# Patient Record
Sex: FEMALE | Race: OTHER
[De-identification: names, ages, dates, MRNs, and addresses within clinical notes are randomized per-mention and may not be internally consistent; named-entity substitution may affect disease eponyms.]

---

## 2018-09-14 ENCOUNTER — HOSPITAL ENCOUNTER (EMERGENCY)
Dept: HOSPITAL 74 - JERFT | Age: 43
Discharge: HOME | End: 2018-09-14
Payer: COMMERCIAL

## 2018-09-14 VITALS — SYSTOLIC BLOOD PRESSURE: 152 MMHG | TEMPERATURE: 99.1 F | HEART RATE: 79 BPM | DIASTOLIC BLOOD PRESSURE: 72 MMHG

## 2018-09-14 VITALS — BODY MASS INDEX: 26.5 KG/M2
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
## 2018-09-14 DIAGNOSIS — S63.631A: Primary | ICD-10-CM
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
## 2018-09-14 PROCEDURE — 2W3KX1Z IMMOBILIZATION OF LEFT FINGER USING SPLINT: ICD-10-PCS

## 2018-09-14 NOTE — PDOC
History of Present Illness





- General


Chief Complaint: Pain


Stated Complaint: FINGER INJURY


Time Seen by Provider: 09/14/18 19:49


History Source: Patient


Exam Limitations: No Limitations





- History of Present Illness


Initial Comments: 





09/14/18 20:32


43 yr female with injury to the left index finger yesterday pulling an 

electrical cord out of the socket and strained the finger causing bruising to 

the finger. Pt has from of the finger


09/14/18 20:44








Past History





- Past Medical History


Allergies/Adverse Reactions: 


 Allergies











Allergy/AdvReac Type Severity Reaction Status Date / Time


 


No Known Allergies Allergy   Verified 09/14/18 19:53











Home Medications: 


Ambulatory Orders





NK [No Known Home Medication]  09/14/18 








COPD: No


Other medical history: Pt denies





- Surgical History


Gastric Stapling:  (Gastric bypass)





- Suicide/Smoking/Psychosocial Hx


Smoking History: Never smoked


Have you smoked in the past 12 months: No


Information on smoking cessation initiated: No


Hx Alcohol Use: No


Drug/Substance Use Hx: No


Substance Use Type: None





*Physical Exam





- Vital Signs


 Last Vital Signs











Temp Pulse Resp BP Pulse Ox


 


 99.1 F   79   18   152/72   100 


 


 09/14/18 19:53  09/14/18 19:53  09/14/18 19:53  09/14/18 19:53  09/14/18 19:53














- Physical Exam


General Appearance: Yes: Nourished, Appropriately Dressed


HEENT: positive: EOMI, SPARKLE


Extremity: positive: Normal Capillary Refill, Other (bruising noted to the left 

index finger on the volar surface at the dip, nv intact FROM )


Integumentary: positive: Normal Color, Dry, Warm


Neurologic: positive: Fully Oriented, Alert, Normal Mood/Affect, Normal Response

, Motor Strength 5/5





Procedures





- Splinting


Splint Location: Left: Finger (index finger)





Medical Decision Making





- Medical Decision Making





09/14/18 20:50


cc: finger injury bruising noted


xray is negative


finger splint placed


follow with the orthopedist next week 











*DC/Admit/Observation/Transfer


Diagnosis at time of Disposition: 


Finger injury


Qualifiers:


 Encounter type: initial encounter Laterality: left Qualified Code(s): S69.92XA 

- Unspecified injury of left wrist, hand and finger(s), initial encounter





Sprain of finger


Qualifiers:


 Encounter type: initial encounter Finger: index finger Sprain of finger site: 

interphalangeal joint Laterality: left Qualified Code(s): S63.631A - Sprain of 

interphalangeal joint of left index finger, initial encounter








- Discharge Dispostion


Disposition: HOME


Condition at time of disposition: Good





- Referrals


Referrals: 


Naseem Banuelos MD [Staff Physician] - 





- Patient Instructions


Additional Instructions: 


follow with the hand specialist  if symptoms worsen or do not improve


use the splint while awake remove to sleep and bathe 


ice applied every 2hrs for 20 minutes 











- Post Discharge Activity

## 2018-09-14 NOTE — PDOC
Rapid Medical Evaluation


Medical Evaluation: 





09/14/18 19:45


I have performed a brief in-person evaluation of this patient.





The patient presents with a chief complaint of:L 2nd finger injury yesterday 

while puling cord yesterday. No fall, no crush injury





Pertinent physical exam findings: ecchymosis to L 2nd finger 





I have ordered the following:xray





The patient will proceed to the ED for further evaluation.





 





**Discharge Disposition





- Diagnosis


Finger injury


Qualifiers:


 Encounter type: initial encounter Laterality: left Qualified Code(s): S69.92XA 

- Unspecified injury of left wrist, hand and finger(s), initial encounter








- Referrals





- Patient Instructions





- Post Discharge Activity

## 2018-10-04 ENCOUNTER — HOSPITAL ENCOUNTER (OUTPATIENT)
Dept: HOSPITAL 74 - JER | Age: 43
Setting detail: OBSERVATION
LOS: 1 days | Discharge: HOME | End: 2018-10-05
Attending: INTERNAL MEDICINE | Admitting: INTERNAL MEDICINE
Payer: COMMERCIAL

## 2018-10-04 VITALS — BODY MASS INDEX: 26.9 KG/M2

## 2018-10-04 DIAGNOSIS — Z98.84: ICD-10-CM

## 2018-10-04 DIAGNOSIS — D50.9: Primary | ICD-10-CM

## 2018-10-04 LAB
ALBUMIN SERPL-MCNC: 3.6 G/DL (ref 3.4–5)
ALP SERPL-CCNC: 70 U/L (ref 45–117)
ALT SERPL-CCNC: 16 U/L (ref 13–61)
ANION GAP SERPL CALC-SCNC: 6 MMOL/L (ref 8–16)
ANISOCYTOSIS BLD QL: (no result)
AST SERPL-CCNC: 13 U/L (ref 15–37)
BASOPHILS # BLD: 1.2 % (ref 0–2)
BILIRUB SERPL-MCNC: 0.6 MG/DL (ref 0.2–1)
BUN SERPL-MCNC: 16 MG/DL (ref 7–18)
CALCIUM SERPL-MCNC: 8.5 MG/DL (ref 8.5–10.1)
CHLORIDE SERPL-SCNC: 110 MMOL/L (ref 98–107)
CO2 SERPL-SCNC: 25 MMOL/L (ref 21–32)
CREAT SERPL-MCNC: 0.8 MG/DL (ref 0.55–1.3)
DEPRECATED RDW RBC AUTO: 18.2 % (ref 11.6–15.6)
EOSINOPHIL # BLD: 3.5 % (ref 0–4.5)
GLUCOSE SERPL-MCNC: 132 MG/DL (ref 74–106)
HCT VFR BLD CALC: 21.3 % (ref 32.4–45.2)
HGB BLD-MCNC: 6 GM/DL (ref 10.7–15.3)
LYMPHOCYTES # BLD: 36.3 % (ref 8–40)
MACROCYTES BLD QL: (no result)
MCH RBC QN AUTO: 16.8 PG (ref 25.7–33.7)
MCHC RBC AUTO-ENTMCNC: 28.3 G/DL (ref 32–36)
MCV RBC: 59.2 FL (ref 80–96)
MONOCYTES # BLD AUTO: 7.5 % (ref 3.8–10.2)
NEUTROPHILS # BLD: 51.5 % (ref 42.8–82.8)
OVALOCYTES BLD QL SMEAR: (no result)
PLATELET # BLD AUTO: 340 K/MM3 (ref 134–434)
PLATELET BLD QL SMEAR: ADEQUATE
PMV BLD: 8.7 FL (ref 7.5–11.1)
POTASSIUM SERPLBLD-SCNC: 3.8 MMOL/L (ref 3.5–5.1)
PROT SERPL-MCNC: 6.9 G/DL (ref 6.4–8.2)
RBC # BLD AUTO: 3.59 M/MM3 (ref 3.6–5.2)
SODIUM SERPL-SCNC: 140 MMOL/L (ref 136–145)
WBC # BLD AUTO: 3.6 K/MM3 (ref 4–10)

## 2018-10-04 PROCEDURE — 30233N1 TRANSFUSION OF NONAUTOLOGOUS RED BLOOD CELLS INTO PERIPHERAL VEIN, PERCUTANEOUS APPROACH: ICD-10-PCS | Performed by: INTERNAL MEDICINE

## 2018-10-04 PROCEDURE — 3E033GC INTRODUCTION OF OTHER THERAPEUTIC SUBSTANCE INTO PERIPHERAL VEIN, PERCUTANEOUS APPROACH: ICD-10-PCS | Performed by: INTERNAL MEDICINE

## 2018-10-04 PROCEDURE — P9058 RBC, L/R, CMV-NEG, IRRAD: HCPCS

## 2018-10-04 PROCEDURE — P9038 RBC IRRADIATED: HCPCS

## 2018-10-04 PROCEDURE — G0378 HOSPITAL OBSERVATION PER HR: HCPCS

## 2018-10-04 RX ADMIN — FERROUS SULFATE TAB EC 324 MG (65 MG FE EQUIVALENT) SCH MG: 324 (65 FE) TABLET DELAYED RESPONSE at 20:30

## 2018-10-04 NOTE — PN
Teaching Attending Note


Name of Resident: Jericho Beck





ATTENDING PHYSICIAN STATEMENT





I saw and evaluated the patient.


I reviewed the resident's note and discussed the case with the resident.


I agree with the resident's findings and plan as documented.








SUBJECTIVE:


Patient is a 43 year old woman with history of gastric bypass sent to the ER to 

receive a blood transfusion after her blood work showed a Hb of 6.2 in he PCP's 

office. She reports feelings of fatigue for the past 2 months. She also 

endorses menometrorhaggia as she used around 6 pads a day during her period. 

She denies intermenstrual spotting or vaginal bleeding. She denies hematuria or 

hematochezia. Denies SOB, palpitations, difficulty breathing, or back pain. 

Denies hot or cold intolerance. 





OBJECTIVE:


Alert


 Vital Signs











 Period  Temp  Pulse  Resp  BP Sys/Kerns  Pulse Ox


 


 Last 24 Hr  98.2 F  91  16  148/66  100











HEENT: No Jaundice, eye redness or discharge, +Pallor; PERRLA, EOMI. 

Normocephalic, atraumatic. External ears are normal and hearing is grossly 

intact. No nasal discharge.


Neck: Supple, nontender. No palpable adenopathy or thyromegaly. No JVD


Chest: Good effort. Clear to auscultation and percussion.


Heart: Regular. No S3 or rub; 2/6 COLUMBA


Abdomen: Not distended, soft, nontender and no HSM. No rebound or guarding.  

Normoactive bowel sounds.


Ext: Peripheral pulses intact. No leg edema.


Skin: Warm and dry. No petechiae, rash or ecchymosis.


Neuro: Alert. Oriented x3. CN 2-12 grossly intact. Sensation grossly intact in 

all four extremities and DTR are symmetric.





 Current Medications











Generic Name Dose Route Start Last Admin





  Trade Name Freq  PRN Reason Stop Dose Admin


 


Docusate Sodium  100 mg  10/04/18 18:35  





  Colace -  PO   





  BID PRN   





  CONSTIPATION   





     





     





     


 


Ferrous Sulfate  325 mg  10/04/18 18:45  





  Feosol -  PO   





  DAILY BERT   





     





     





     





     


 


Polyethylene Glycol  17 gm  10/05/18 10:00  





  Miralax (For Daily Use) -  PO   





  DAILY BERT   





     





     





     





     








 Home Medications











 Medication  Instructions  Recorded


 


NK [No Known Home Medication]  09/14/18








 Abnormal Lab Results











  10/04/18 10/04/18 10/04/18





  16:10 16:10 17:34


 


WBC  3.6 L  


 


RBC  3.59 L  


 


Hgb  6.0 L*  


 


Hct  21.3 L  


 


MCV  59.2 L  


 


MCH  16.8 L  


 


MCHC  28.3 L  


 


RDW  18.2 H  


 


Chloride    110 H


 


Anion Gap    6 L


 


Random Glucose    132 H


 


AST    13 L


 


Crossmatch   See Detail 











ASSESSMENT AND PLAN:


1. Symptomatic Anemia - Low MCV anemia most likely due to menstrual blood loss. 

Will do basic anemia work up including serial stool guaiacs, reticulocyte count 

and iron studies. Would also check Vit B12 and folate levels in view of bypass 

history. Has already been slated for blood transfusion. Once iron deficiency is 

confirmed, in the future, will benefit from IV iron therapy. Needs uterine 

sonogram to rule out fibroids and GI consult for colonoscopy.





2. DVT prophylaxis - Lovenox 40 mg SQ q 24 hours.





3. Advance directives - Full code

## 2018-10-04 NOTE — PDOC
Rapid Medical Evaluation


Chief Complaint: Blood Transfusion


Time Seen by Provider: 10/04/18 15:54


Medical Evaluation: 


 Allergies











Allergy/AdvReac Type Severity Reaction Status Date / Time


 


No Known Allergies Allergy   Verified 09/14/18 19:53











10/04/18 15:55


CC: "I need a blood transfusion."





HPI:  Pt is an 43 F sent by her PCP for a blood transfusion.  Pt denies 

menorrhagia, denies hematochezia, admits to fatigue and SOB.  Hg was 6.1 

according to patient.





I have performed a brief in- person evaluation of this patient.





Pertinent Physical Findings:


Skin: Pale


Lungs: Clear


Heart: RRR


Abdomen: no pain upon palpation; no CVAT


Neuro: Alert


Psych: Appropriate affect





I have ordered: basic labs and type and cross





The patient will proceed to: Main ED for further evaluation





**Discharge Disposition





- Diagnosis


Anemia


Qualifiers:


 Anemia type: other cause 








- Referrals





- Patient Instructions





- Post Discharge Activity

## 2018-10-04 NOTE — PDOC
Attending Attestation





- Resident


Resident Name: Gurwinder Adams





- ED Attending Attestation


I have performed the following: I have examined & evaluated the patient, The 

case was reviewed & discussed with the resident, I agree w/resident's findings 

& plan, Exceptions are as noted





- HPI


HPI: 





10/04/18 17:42


The patient is a 43 year old female, with a significant past medical history of 

gastric bypass, who presents to the emergency department for evaluation of a 

hemoglobin of 6.2 at Dr. Smith office. Pt states that she has typically very 

heavy periods, soaking through 6 pads per day. Denies any vaginal bleeding at 

the time. Denies dark stool or BRBPR.





The patient denies chest pain, shortness of breath, headache and dizziness. The 

patient denies fever, chills, nausea, vomit, diarrhea and constipation. The 

patient denies dysuria, frequency, urgency and hematuria. 





Allergies: NKDA


Past surgical history: Gastric bypass 


Social history: denies toxic habits


PCP - Dr. Jericho Amin








- Physicial Exam


PE: 





10/04/18 17:56


GENERAL: Awake, alert, and fully oriented, in no acute distress.


HEAD: No signs of trauma


EYES: PERRLA, EOMI, sclera anicteric, conjunctiva clear


ENT: Auricles normal inspection, hearing grossly normal, nares patent, 

oropharynx clear without exudates. Moist mucosa


NECK: Nontender, no stepoffs, Normal ROM, supple, no lymphadenopathy, JVD, or 

masses


LUNGS: Breath sounds equal, clear to auscultation bilaterally.  No wheezes, and 

no crackles


HEART: Regular rate and rhythm, normal S1 and S2, no murmurs, rubs or gallops


ABDOMEN: Soft, nontender, normoactive bowel sounds.  No guarding, no rebound.  

No masses


EXTREMITIES: Normal range of motion, no edema.  No clubbing or cyanosis. No 

cords, erythema, or tenderness


NEUROLOGICAL: Cranial nerves II through XII intact. 5/5 strength and sensation 

in all extremities, Normal speech, normal gait, normal cerebellar function


SKIN: Warm, Dry, normal turgor, no rashes or lesions noted.





- Medical Decision Making





10/04/18 17:56


43 F with low Hb on outpt labs, likely 2/2 menorrhagia. No evidence of GI 

bleed. Pt HD stable in ED.


- Labs, T&S, coags


- Transfuse PRN

## 2018-10-04 NOTE — PDOC
History of Present Illness





- General


Chief Complaint: Blood Transfusion


Stated Complaint: PCP SENT


Time Seen by Provider: 10/04/18 15:54





- History of Present Illness


Initial Comments: 





44 yo F w no sig pmh and no known hx of anemia was sent here to the ER to 

receive a blood transfusion after her bloodwork showed a Hb of 6.2. She reports 

feelings of fatigue for the past 2 months. She also endorses menometrorhaggia 

as she used around 6 pads a day during her period. She denies intermenstrual 

spotting or vaginal bleeding. She denies hematuria or hematochezia. 


Denies recent travel, infections, fevers, or chills. Denies SOB, difficulty 

breathing, or back pain. Denies hot or cold intolerance. Denies palpitations. 





Social hx: Denies cigarette, alcohol, or illicit drug usage. 


PCP: Jericho torres











Past History





- Past Medical History


Allergies/Adverse Reactions: 


 Allergies











Allergy/AdvReac Type Severity Reaction Status Date / Time


 


No Known Allergies Allergy   Verified 10/04/18 15:55











Home Medications: 


Ambulatory Orders





NK [No Known Home Medication]  09/14/18 








COPD: No


CHF: No





- Surgical History


Gastric Stapling:  (Gastric bypass)





- Immunization History


Immunization Up to Date: Yes





- Suicide/Smoking/Psychosocial Hx


Smoking History: Never smoked


Have you smoked in the past 12 months: No


Hx Alcohol Use: No


Drug/Substance Use Hx: No


Substance Use Type: None





**Review of Systems





- Review of Systems


Comments:: 





CONSTITUTIONAL: 


Present: generalized weakness


Absent: fever, chills, diaphoresis, malaise, loss of appetite


HEENT: 


Absent: rhinorrhea, nasal congestion, throat pain, throat swelling, difficulty 

swallowing, mouth swelling, ear pain, eye pain, visual Changes


CARDIOVASCULAR: 


Present: lightheadedness


Absent: chest pain, syncope, palpitations, irregular heart rate, peripheral 

edema


RESPIRATORY: 


Absent: cough, shortness of breath, dyspnea with exertion, orthopnea, wheezing, 

stridor, hemoptysis


GASTROINTESTINAL:


Absent: abdominal pain, abdominal distension, nausea, vomiting, diarrhea, 

constipation, melena, hematochezia


GENITOURINARY: 


Absent: dysuria, frequency, urgency, hesitancy, hematuria, flank pain, genital 

pain


MUSCULOSKELETAL: 


Absent: myalgia, arthralgia, joint swelling


SKIN: 


Present: Pallor


Absent: rash, itching


HEMATOLOGIC/IMMUNOLOGIC: 


Absent: easy bleeding, easy bruising, lymphadenopathy, frequent infections


ENDOCRINE:


Absent: unexplained weight gain, unexplained weight loss, heat intolerance, 

cold intolerance


NEUROLOGIC: 


Absent: headache, focal weakness or paresthesias, dizziness, unsteady gait, 

seizure, mental status changes, bladder or bowel incontinence


PSYCHIATRIC: 


Absent: anxiety, depression, suicidal or homicidal ideation, hallucinations.











*Physical Exam





- Vital Signs


 Last Vital Signs











Temp Pulse Resp BP Pulse Ox


 


 98.2 F   91 H  16   148/66   100 


 


 10/04/18 15:56  10/04/18 15:56  10/04/18 15:56  10/04/18 15:56  10/04/18 15:56














- Physical Exam


Comments: 





GENERAL:


Well developed, well nourished. Awake and alert. No acute distress.


HEENT:


There is conjunctival pallor. 


Normocephalic, atraumatic. PERRLA, EOMI. Sclera are non-icteric. Moist mucous 

membranes. Oropharynx is clear.


NECK: 


Supple. Full ROM. No JVD. No thyromegaly. No lymphadenopathy.


CARDIOVASCULAR:


Regular rate and rhythm. No murmurs, rubs, or gallops. Distal pulses are 2+ and 

symmetric. 


PULMONARY: 


No evidence of respiratory distress. Lungs clear to auscultation bilaterally. 

No wheezing, rales or rhonchi.


ABDOMINAL:


Soft. Non-tender. Non-distended. No rebound or guarding. No organomegaly. 

Normoactive bowel sounds. 


MUSCULOSKELETAL 


Normal range of motion at all joints. No bony deformities or tenderness. No CVA 

tenderness.


EXTREMITIES: 


No cyanosis. No clubbing. No edema. No calf tenderness.


SKIN: 


Warm and dry. Normal capillary refill. No rashes. No jaundice. 


NEUROLOGICAL: 


Alert, awake, appropriate. Cranial nerves 2-12 intact. Normal speech.


PSYCHIATRIC: 


Cooperative. Good eye contact. Appropriate mood and affect.











ED Treatment Course





- LABORATORY


CBC & Chemistry Diagram: 


 10/04/18 16:10








- ADDITIONAL ORDERS


Additional order review: 


 Laboratory  Results











  10/04/18





  16:10


 


WBC  3.6 L


 


RBC  3.59 L


 


Hct  21.3 L


 


MCV  59.2 L


 


MCH  16.8 L


 


MCHC  28.3 L


 


RDW  18.2 H


 


Plt Count  340


 


MPV  8.7


 


Absolute Neuts (auto)  1.8


 


Neutrophils %  51.5


 


Lymphocytes %  36.3


 


Monocytes %  7.5


 


Eosinophils %  3.5


 


Basophils %  1.2


 


Nucleated RBC %  0








 











  10/04/18





  16:10


 


RBC  3.59 L


 


MCV  59.2 L


 


MCHC  28.3 L


 


RDW  18.2 H


 


MPV  8.7


 


Neutrophils %  51.5


 


Lymphocytes %  36.3


 


Monocytes %  7.5


 


Eosinophils %  3.5


 


Basophils %  1.2














Medical Decision Making





- Medical Decision Making





44 yo F w no sig pmh and no known hx of anemia was sent here to the ER to 

receive a blood transfusion after her bloodwork showed a Hb of 6.2.


It's possible her anemia is a result of blood loss from heavy periods. 





Plan: Basic labs, ekg, urine, blood transfusion, re-assess, admit to [facility]. 








*DC/Admit/Observation/Transfer


Diagnosis at time of Disposition: 


Anemia


Qualifiers:


 Anemia type: other cause 








- Referrals





- Patient Instructions





- Post Discharge Activity

## 2018-10-04 NOTE — HP
CHIEF COMPLAINT: anemia





PCP: Dr. Jericho Amin





HISTORY OF PRESENT ILLNESS:





43 year old female with no pmh presents to the hospital after being sent from 

PCP for a hgb of 6.2. Patient reports that for the past 2 months, she's been 

feeling fatigued, dizzy, and short of breath on exertion. She denies any overt 

chest pain or shortness of breath at rest. Denies feeling this way before, but 

reports that she was told she had anemia in the past. Her last menstrual period 

was 2 weeks ago and lasted 5 days with normal flow. She Does state that in the 

past, 1-3 years ago, she had episodes of heavier periods, but those have since 

calmed down. She reports that she currently chews on ice. Denies fevers, chills

, chest pain, SOB, nausea, vomiting, diarrhea. Denies any family history of 

colon cancer.





ER course was notable for:


(1) Hgb 6


(2) WBC 3.6


(3)





Recent Travel: denies





PAST MEDICAL HISTORY: anemia





PAST SURGICAL HISTORY: gastric bypass 





Social History:


Smoking: never


Alcohol: never


Drugs: never





Family History: hypothyroidism in mother, prostate and thyroid problems in 

father, hypertension on both sides of the family.


Allergies





No Known Allergies Allergy (Verified 10/04/18 15:55)


 








HOME MEDICATIONS:


 Home Medications











 Medication  Instructions  Recorded


 


NK [No Known Home Medication]  09/14/18








REVIEW OF SYSTEMS


CONSTITUTIONAL: generalized weakness


Absent:  fever, chills, diaphoresis, , malaise, loss of appetite, weight change


HEENT: 


Absent:  rhinorrhea, nasal congestion, throat pain, throat swelling, difficulty 

swallowing, mouth swelling, ear pain, eye pain, visual changes


CARDIOVASCULAR: 


Absent: chest pain, syncope, palpitations, irregular heart rate, lightheadedness

, peripheral edema


RESPIRATORY: 


Absent: cough, shortness of breath, dyspnea with exertion, orthopnea, wheezing, 

stridor, hemoptysis


GASTROINTESTINAL:


Absent: abdominal pain, abdominal distension, nausea, vomiting, diarrhea, 

constipation, melena, hematochezia


GENITOURINARY: 


Absent: dysuria, frequency, urgency, hesitancy, hematuria, flank pain, genital 

pain


MUSCULOSKELETAL: 


Absent: myalgia, arthralgia, joint swelling, back pain, neck pain


SKIN: 


Absent: rash, itching, pallor


HEMATOLOGIC/IMMUNOLOGIC: 


Absent: easy bleeding, easy bruising, lymphadenopathy, frequent infections


ENDOCRINE:


Absent: unexplained weight gain, unexplained weight loss, heat intolerance, 

cold intolerance


NEUROLOGIC: 


Absent: headache, focal weakness or paresthesias, dizziness, unsteady gait, 

seizure, mental status changes, bladder or bowel incontinence


PSYCHIATRIC: 


Absent: anxiety, depression, suicidal or homicidal ideation, hallucinations.








PHYSICAL EXAMINATION


 Vital Signs - 24 hr











  10/04/18





  15:56


 


Temperature 98.2 F


 


Pulse Rate 91 H


 


Respiratory 16





Rate 


 


Blood Pressure 148/66


 


O2 Sat by Pulse 100





Oximetry (%) 











GENERAL: A&Ox3, no acute distress


EYES: PERRLA, EOMI


ENT: Moist mucus membranes


NECK: No JVD


LUNGS: CTA, no wheezes


HEART: RRR, harsh systolic murmur appreciated on exam


ABDOMEN: Soft, nontender, BS present


MUSCULOSKELETAL: No CVA Tenderness


EXTREMITIES: 2+ pulses, no edema. 


NEUROLOGICAL:  Cranial nerves II-XII intact.








 Laboratory Results - last 24 hr











  10/04/18 10/04/18 10/04/18





  16:10 16:10 17:34


 


WBC  3.6 L  


 


RBC  3.59 L  


 


Hgb  6.0 L*  


 


Hct  21.3 L  


 


MCV  59.2 L  


 


MCH  16.8 L  


 


MCHC  28.3 L  


 


RDW  18.2 H  


 


Plt Count  340  


 


MPV  8.7  


 


Absolute Neuts (auto)  1.8  


 


Neutrophils %  51.5  


 


Lymphocytes %  36.3  


 


Monocytes %  7.5  


 


Eosinophils %  3.5  


 


Basophils %  1.2  


 


Nucleated RBC %  0  


 


Sodium    140


 


Potassium    3.8


 


Chloride    110 H


 


Carbon Dioxide    25


 


Anion Gap    6 L


 


BUN    16


 


Creatinine    0.8


 


Creat Clearance w eGFR    > 60


 


Random Glucose    132 H


 


Calcium    8.5


 


Total Bilirubin    0.6


 


AST    13 L


 


ALT    16


 


Alkaline Phosphatase    70


 


Total Protein    6.9


 


Albumin    3.6


 


Serum Pregnancy, Qual   


 


Blood Type   O POSITIVE 


 


Antibody Screen   Negative 


 


Crossmatch   See Detail 














  10/04/18





  17:34


 


WBC 


 


RBC 


 


Hgb 


 


Hct 


 


MCV 


 


MCH 


 


MCHC 


 


RDW 


 


Plt Count 


 


MPV 


 


Absolute Neuts (auto) 


 


Neutrophils % 


 


Lymphocytes % 


 


Monocytes % 


 


Eosinophils % 


 


Basophils % 


 


Nucleated RBC % 


 


Sodium 


 


Potassium 


 


Chloride 


 


Carbon Dioxide 


 


Anion Gap 


 


BUN 


 


Creatinine 


 


Creat Clearance w eGFR 


 


Random Glucose 


 


Calcium 


 


Total Bilirubin 


 


AST 


 


ALT 


 


Alkaline Phosphatase 


 


Total Protein 


 


Albumin 


 


Serum Pregnancy, Qual  Negative


 


Blood Type 


 


Antibody Screen 


 


Crossmatch 











ASSESSMENT/PLAN:





43 year old female with no pmh presents for anemia





#Anemia: likely iron deficiency compounded on former heavy menstruation


-transfuse 2U PRBCs


-repeat H&H in between units and in AM


-ferrous sulfate in the morning 325 daily


-stool softeners


-iron studies ordered for the morning


-reticulocytes ordered


-TV US for fibroids ordered


-GI consulted for anemia, poss colo as outpt





#FEN


-2 U blood ordered


-replete lytes as necessary in AM


-regular diet





#Prophylaxis


-SCDs





#Disposition


-admit obs





Visit type





- Emergency Visit


Emergency Visit: Yes


Care time: The patient presented to the Emergency Department on the above date 

and was hospitalized for further evaluation of their emergent condition.





- New Patient


This patient is new to me today: Yes


Date on this admission: 10/04/18





- Critical Care


Critical Care patient: No

## 2018-10-05 VITALS — HEART RATE: 62 BPM | DIASTOLIC BLOOD PRESSURE: 72 MMHG | SYSTOLIC BLOOD PRESSURE: 138 MMHG | TEMPERATURE: 98.2 F

## 2018-10-05 LAB
ANION GAP SERPL CALC-SCNC: 7 MMOL/L (ref 8–16)
BUN SERPL-MCNC: 14 MG/DL (ref 7–18)
CALCIUM SERPL-MCNC: 9 MG/DL (ref 8.5–10.1)
CHLORIDE SERPL-SCNC: 111 MMOL/L (ref 98–107)
CO2 SERPL-SCNC: 26 MMOL/L (ref 21–32)
CREAT SERPL-MCNC: 0.6 MG/DL (ref 0.55–1.3)
DEPRECATED RDW RBC AUTO: 25.7 % (ref 11.6–15.6)
GLUCOSE SERPL-MCNC: 72 MG/DL (ref 74–106)
HCT VFR BLD CALC: 29.3 % (ref 32.4–45.2)
HGB BLD-MCNC: 8.8 GM/DL (ref 10.7–15.3)
MCH RBC QN AUTO: 19.9 PG (ref 25.7–33.7)
MCHC RBC AUTO-ENTMCNC: 30 G/DL (ref 32–36)
MCV RBC: 66.3 FL (ref 80–96)
PLATELET # BLD AUTO: 296 K/MM3 (ref 134–434)
PMV BLD: 8.4 FL (ref 7.5–11.1)
POTASSIUM SERPLBLD-SCNC: 4.3 MMOL/L (ref 3.5–5.1)
RBC # BLD AUTO: 4.43 M/MM3 (ref 3.6–5.2)
SODIUM SERPL-SCNC: 143 MMOL/L (ref 136–145)
WBC # BLD AUTO: 4.5 K/MM3 (ref 4–10)

## 2018-10-05 RX ADMIN — FERROUS SULFATE TAB EC 324 MG (65 MG FE EQUIVALENT) SCH MG: 324 (65 FE) TABLET DELAYED RESPONSE at 09:30

## 2018-10-05 NOTE — DS
Physical Exam: 


SUBJECTIVE: Patient seen and examined this morning at bedside while receiving 

blood transfusion. Has never received transfusion before. Denies any noticeable 

bleeds including hematuria, hematemesis, hematochezia or hemoptysis. Has a 3 

year hx of clots when menstruating that have recently subsided, approx 3 months 

ago. Recently has used 1000mg PO Tylenol for toothache. Denies any excess NSAID 

use, hx of PUD or recent dark stools. Currently denies any SOB, headache, 

fatigues, dizziness, lightheadedness, chest pain, SOB, nausea, vomiting. Says 

she feels much better since admission.








OBJECTIVE:





 Vital Signs











 Period  Temp  Pulse  Resp  BP Sys/Kerns  Pulse Ox


 


 Last 24 Hr  98.1 F-98.9 F  60-75  16-18  118-144/65-90  100-100








PHYSICAL EXAM








GENERAL: A&Ox3, NAD


EYES: PERRL, EOMI


ENT: oropharynx clear without exudates, MMM


NECK: No JVD


LUNGS: CTA b/l, no wheezes


HEART: Regular rate and rhythm, S1, S2 without murmur


ABDOMEN: Soft, nontender, nondistended, + bowel sounds, no guarding


EXTREMITIES: 2+ pulses, no edema. 


NEUROLOGICAL: Cranial nerves II through XII grossly intact. Normal speech. 5/5 

Muscle strength to Handgrip, Elbow flexion/extension, and hip flexion/

extension. Gross sensation intact throughout. 


PSYCH: Normal mood, normal affect.


SKIN: Warm, dry, normal turgor, no rashes or lesions noted.











LABS


 Laboratory Results - last 24 hr











  10/04/18 10/04/18 10/04/18





  16:10 16:10 17:34


 


WBC   


 


RBC   


 


Hgb   


 


Hct   


 


MCV   


 


MCH   


 


MCHC   


 


RDW   


 


Plt Count   


 


MPV   


 


Hypochromia  3+  


 


Platelet Estimate  Adequate  


 


Platelet Comment  No clumping noted  


 


Polychromasia  1+  


 


Anisocytosis  3+  


 


Microcytosis  3+  


 


Macrocytosis  1+  


 


Ovalocytes  1+  


 


Schistocytes  1+  


 


Retic Count   


 


Sodium   


 


Potassium   


 


Chloride   


 


Carbon Dioxide   


 


Anion Gap   


 


BUN   


 


Creatinine   


 


Creat Clearance w eGFR   


 


Random Glucose   


 


Calcium   


 


Ferritin   


 


Serum Pregnancy, Qual    Negative


 


Blood Type   O POSITIVE 


 


Antibody Screen   Negative 


 


Crossmatch   See Detail 














  10/04/18 10/05/18 10/05/18





  20:50 09:57 09:57


 


WBC   4.5 


 


RBC   4.43 


 


Hgb   8.8 L 


 


Hct   29.3 L D 


 


MCV   66.3 L 


 


MCH   19.9 L D 


 


MCHC   30.0 L 


 


RDW   25.7 H 


 


Plt Count   296 


 


MPV   8.4 


 


Hypochromia   


 


Platelet Estimate   


 


Platelet Comment   


 


Polychromasia   


 


Anisocytosis   


 


Microcytosis   


 


Macrocytosis   


 


Ovalocytes   


 


Schistocytes   


 


Retic Count   


 


Sodium    143


 


Potassium    4.3


 


Chloride    111 H


 


Carbon Dioxide    26


 


Anion Gap    7 L


 


BUN    14


 


Creatinine    0.6


 


Creat Clearance w eGFR    > 60


 


Random Glucose    72 L


 


Calcium    9.0


 


Ferritin   


 


Serum Pregnancy, Qual   


 


Blood Type  O POSITIVE  


 


Antibody Screen   


 


Crossmatch   














  10/05/18 10/05/18





  09:57 09:57


 


WBC  


 


RBC  


 


Hgb  


 


Hct  


 


MCV  


 


MCH  


 


MCHC  


 


RDW  


 


Plt Count  


 


MPV  


 


Hypochromia  


 


Platelet Estimate  


 


Platelet Comment  


 


Polychromasia  


 


Anisocytosis  


 


Microcytosis  


 


Macrocytosis  


 


Ovalocytes  


 


Schistocytes  


 


Retic Count   1.09


 


Sodium  


 


Potassium  


 


Chloride  


 


Carbon Dioxide  


 


Anion Gap  


 


BUN  


 


Creatinine  


 


Creat Clearance w eGFR  


 


Random Glucose  


 


Calcium  


 


Ferritin  3.6 L 


 


Serum Pregnancy, Qual  


 


Blood Type  


 


Antibody Screen  


 


Crossmatch  














IMAGING:


-EKG: NORMAL SINUS RHYTHM, NORMAL ECG


-TVUS: No discrete uterine leiomyoma is identified. The uterine echotexture is 

slightly heterogeneous diffusely which could be on the basis of adenomyosis. If 

clinically indicated correlate with nonemergent MRI. Mild nonspecific 

endometrial thickening is seen which may be physiologic in nature. Correlate 

with menstrual cycle phase. 2.7 cm right ovarian cyst. The left ovary could not 

be definitely visualized. 








HOSPITAL COURSE:





Date of Admission:10/04/18





Date of Discharge: 10/05/18





44 y/o F with no significant PMHx presented to [facility] ED from her PCP with a Hgb 

of 6.2. 2 Units of pRBC's were transfused and her repeat Hgb was 8.8. Patient 

was also started on Ferrous sulfate and Iron Sucrose 100mg. A TVUS was done and 

is noted above. Patient was discharged home with strict instructions to follow 

up with hematology to complete the iron infusion protocol, gynecology, begin 

Iron supplementation and repeat her CBC in one week. 














Minutes to complete discharge: 40





Discharge Summary


Reason For Visit: ANEMIA


Current Active Problems





Anemia (Acute)








Condition: Improved





- Instructions


Diet, Activity, Other Instructions: 


You presented to the hospital with a hemoglobin level of 6.0. 2 units of blood 

was transfused and your symptoms resolved. Your repeat hemoglobin is 8.8. 





Your blood work showed your iron levels were low. You received Iron infusions 

during your stay and will need to follow up with hematology to complete the 

course of intravenous Iron . Please call Dr. Courtney's office to schedule follow 

up. 





You are being started on Iron supplements to be taken twice a day for the first 

week and then three times a day there after. take colace ( sttool softner , 

over the counter ) daily or twice a day to avoid constipation that comes 

withiron use 





You need to follow up with your gynecologist to further manage your gynecologic 

symptoms. and to follow up on ultrasound findings 





Please follow up with your primary care physician in one week. 


You need to have blood work (Complete blood count) done to recheck your 

hemoglobin level. 





If you do not have a primary doctor, you can follow up at the [facility] with Dr. Chavis on Tuesday at 2pm. 





Please return to the ER if you have any signs or symptoms of chest pain, 

shortness of breath, uncontrollable fever, chills, nausea, vomiting, numbness, 

tingling, or weakness in any part of your body, changes in vision, or slurred 

speech.





Please return to the ER if symptoms persist, worsen, or new symptoms arise.








Referrals: 


Chris Valverde MD [Staff Physician] - 1 Week


Matt Courtney MD [Staff Physician] - 1 Week


Disposition: HOME





- Home Medications


Comprehensive Discharge Medication List: 


Ambulatory Orders





Docusate Sodium [Colace -] 100 mg PO BID PRN #60 capsule 10/05/18 


Ferrous Sulfate [Feosol] 325 mg PO BID #77 ud 10/05/18 








This patient is new to me today: Yes


Date on this admission: 10/05/18


Emergency Visit: Yes


ED Registration Date: 10/04/18


Care time: The patient presented to the Emergency Department on the above date 

and was hospitalized for further evaluation of their emergent condition.


Critical Care patient: No





- Discharge Referral


Referred to [facility] No

## 2018-10-05 NOTE — EKG
Test Reason : 

Blood Pressure : ***/*** mmHG

Vent. Rate : 079 BPM     Atrial Rate : 079 BPM

   P-R Int : 174 ms          QRS Dur : 074 ms

    QT Int : 382 ms       P-R-T Axes : 046 020 029 degrees

   QTc Int : 438 ms

 

NORMAL SINUS RHYTHM

NORMAL ECG

NO PREVIOUS ECGS AVAILABLE

Confirmed by GIA CURTIS MD ([id number]) on 10/5/2018 9:21:54 AM

 

Referred By:             Confirmed By:GIA CURTIS MD

## 2018-10-06 LAB
SERUM IRON SATURATION: 10 % (ref 15–55)
TIBC SERPL-MCNC: 371 UG/DL (ref 250–450)
UIBC SERPL-MCNC: 334 UG/DL (ref 131–425)

## 2020-09-09 NOTE — PN
Routed to Dr. Mcdonald for Thursday.    Teaching Attending Note


Name of Resident: Annabel Moreno





ATTENDING PHYSICIAN STATEMENT





I saw and evaluated the patient.


I reviewed the resident's note and discussed the case with the resident.


I agree with the resident's findings and plan as documented.








SUBJECTIVE:


No fever or chills, has no abd pain. reports heavy menses, was w/u in past by 

her gynecologist and no cause was found. she has no GI bleed , melena or 

hematochezia. no abd pain . feels better now 





OBJECTIVE:





NAD 


Cv : RRR


Lungs: CATB 


Ext: no edema 


Lungs: CTAB 


Abd: sfot, NT, ND , NL BS . 


ext : no edema 





A/P : 





42 y/o lady with h/o heavy menses who presented with anemia 





1- severe iron deficiency anemia: due to heavy menses . no GI bleed reported. 


- US  pending 


- iron studies indicate severe depletion in iron stores. 


- give IV iron x 1 


- refer to heme for  iron transfusion protocol 


- need periodic blood work 


- no need for GI w/u as in pt as source is likely  


f/u with GYN as out pt 








dc home today

## 2022-02-23 ENCOUNTER — HOSPITAL ENCOUNTER (EMERGENCY)
Dept: HOSPITAL 74 - JERFT | Age: 47
Discharge: HOME | End: 2022-02-23
Payer: COMMERCIAL

## 2022-02-23 VITALS — SYSTOLIC BLOOD PRESSURE: 132 MMHG | HEART RATE: 75 BPM | TEMPERATURE: 97.3 F | DIASTOLIC BLOOD PRESSURE: 80 MMHG

## 2022-02-23 VITALS — BODY MASS INDEX: 28.8 KG/M2

## 2022-02-23 DIAGNOSIS — M79.644: Primary | ICD-10-CM
